# Patient Record
Sex: MALE | Race: WHITE
[De-identification: names, ages, dates, MRNs, and addresses within clinical notes are randomized per-mention and may not be internally consistent; named-entity substitution may affect disease eponyms.]

---

## 2017-06-15 ENCOUNTER — HOSPITAL ENCOUNTER (OUTPATIENT)
Dept: HOSPITAL 45 - C.LAB1850 | Age: 47
Discharge: HOME | End: 2017-06-15
Attending: INTERNAL MEDICINE
Payer: COMMERCIAL

## 2017-06-15 DIAGNOSIS — M10.9: ICD-10-CM

## 2017-06-15 DIAGNOSIS — R80.9: ICD-10-CM

## 2017-06-15 DIAGNOSIS — E10.65: ICD-10-CM

## 2017-06-15 DIAGNOSIS — R68.89: Primary | ICD-10-CM

## 2017-06-15 DIAGNOSIS — I48.0: ICD-10-CM

## 2017-06-15 LAB
ALBUMIN/GLOB SERPL: 1 {RATIO} (ref 0.9–2)
ALP SERPL-CCNC: 105 U/L (ref 45–117)
ALT SERPL-CCNC: 38 U/L (ref 12–78)
ANION GAP SERPL CALC-SCNC: 7 MMOL/L (ref 3–11)
AST SERPL-CCNC: 17 U/L (ref 15–37)
BASOPHILS # BLD: 0.03 K/UL (ref 0–0.2)
BASOPHILS NFR BLD: 1 %
BUN SERPL-MCNC: 17 MG/DL (ref 7–18)
BUN/CREAT SERPL: 11.1 (ref 10–20)
CALCIUM SERPL-MCNC: 9.1 MG/DL (ref 8.5–10.1)
CHLORIDE SERPL-SCNC: 104 MMOL/L (ref 98–107)
CHOLEST/HDLC SERPL: 2.4 {RATIO}
CO2 SERPL-SCNC: 27 MMOL/L (ref 21–32)
COMPLETE: YES
CREAT SERPL-MCNC: 1.5 MG/DL (ref 0.6–1.4)
EOSINOPHIL NFR BLD AUTO: 158 K/UL (ref 130–400)
EST. AVERAGE GLUCOSE BLD GHB EST-MCNC: 192 MG/DL
GLOBULIN SER-MCNC: 4.1 GM/DL (ref 2.5–4)
GLUCOSE SERPL-MCNC: 202 MG/DL (ref 70–99)
GLUCOSE UR QL: 44 MG/DL
HCT VFR BLD CALC: 44.6 % (ref 42–52)
IG%: 0.3 %
IMM GRANULOCYTES NFR BLD AUTO: 29.6 %
KETONES UR QL STRIP: 43 MG/DL
LYME DISEASE AB IGG: (no result)
LYME DISEASE AB IGM: (no result)
LYMPHOCYTES # BLD: 0.92 K/UL (ref 1.2–3.4)
MAGNESIUM SERPL-MCNC: 2.2 MG/DL (ref 1.8–2.4)
MCH RBC QN AUTO: 29 PG (ref 25–34)
MCHC RBC AUTO-ENTMCNC: 33.2 G/DL (ref 32–36)
MCV RBC AUTO: 87.5 FL (ref 80–100)
MONOCYTES NFR BLD: 19.9 %
NEUTROPHILS # BLD AUTO: 2.9 %
NEUTROPHILS NFR BLD AUTO: 46.3 %
NITRITE UR QL STRIP: 102 MG/DL (ref 0–150)
PH UR: 107 MG/DL (ref 0–200)
PMV BLD AUTO: 10.8 FL (ref 7.4–10.4)
POTASSIUM SERPL-SCNC: 4 MMOL/L (ref 3.5–5.1)
RBC # BLD AUTO: 5.1 M/UL (ref 4.7–6.1)
SODIUM SERPL-SCNC: 138 MMOL/L (ref 136–145)
URATE SERPL-MCNC: 5.7 MG/DL (ref 2.6–7.2)
VERY LOW DENSITY LIPOPROT CALC: 20 MG/DL
WBC # BLD AUTO: 3.11 K/UL (ref 4.8–10.8)

## 2017-07-28 ENCOUNTER — HOSPITAL ENCOUNTER (INPATIENT)
Dept: HOSPITAL 45 - C.EDB | Age: 47
LOS: 1 days | Discharge: HOME | DRG: 291 | End: 2017-07-29
Attending: INTERNAL MEDICINE | Admitting: INTERNAL MEDICINE
Payer: COMMERCIAL

## 2017-07-28 VITALS
HEIGHT: 70 IN | HEIGHT: 70 IN | BODY MASS INDEX: 20.55 KG/M2 | WEIGHT: 143.52 LBS | BODY MASS INDEX: 20.55 KG/M2 | WEIGHT: 143.52 LBS

## 2017-07-28 VITALS
OXYGEN SATURATION: 96 % | TEMPERATURE: 98.24 F | HEART RATE: 84 BPM | SYSTOLIC BLOOD PRESSURE: 106 MMHG | DIASTOLIC BLOOD PRESSURE: 73 MMHG

## 2017-07-28 VITALS
TEMPERATURE: 97.34 F | HEART RATE: 87 BPM | OXYGEN SATURATION: 94 % | DIASTOLIC BLOOD PRESSURE: 82 MMHG | SYSTOLIC BLOOD PRESSURE: 120 MMHG

## 2017-07-28 VITALS — OXYGEN SATURATION: 96 %

## 2017-07-28 VITALS — OXYGEN SATURATION: 88 %

## 2017-07-28 DIAGNOSIS — J96.01: ICD-10-CM

## 2017-07-28 DIAGNOSIS — I50.23: Primary | ICD-10-CM

## 2017-07-28 DIAGNOSIS — N18.3: ICD-10-CM

## 2017-07-28 DIAGNOSIS — E78.5: ICD-10-CM

## 2017-07-28 DIAGNOSIS — M10.9: ICD-10-CM

## 2017-07-28 DIAGNOSIS — I42.9: ICD-10-CM

## 2017-07-28 DIAGNOSIS — Z79.899: ICD-10-CM

## 2017-07-28 DIAGNOSIS — I13.0: ICD-10-CM

## 2017-07-28 DIAGNOSIS — Z79.82: ICD-10-CM

## 2017-07-28 DIAGNOSIS — Z79.4: ICD-10-CM

## 2017-07-28 DIAGNOSIS — E10.22: ICD-10-CM

## 2017-07-28 LAB
ANION GAP SERPL CALC-SCNC: 8 MMOL/L (ref 3–11)
BASOPHILS # BLD: 0.01 K/UL (ref 0–0.2)
BASOPHILS NFR BLD: 0.1 %
BUN SERPL-MCNC: 21 MG/DL (ref 7–18)
BUN/CREAT SERPL: 15.1 (ref 10–20)
CALCIUM SERPL-MCNC: 9.5 MG/DL (ref 8.5–10.1)
CHLORIDE SERPL-SCNC: 105 MMOL/L (ref 98–107)
CHOLEST/HDLC SERPL: 2.5 {RATIO}
CKMB/CK RATIO: 0.7 (ref 0–3)
CO2 SERPL-SCNC: 25 MMOL/L (ref 21–32)
COMPLETE: YES
CREAT CL PREDICTED SERPL C-G-VRATE: 87.1 ML/MIN
CREAT SERPL-MCNC: 1.4 MG/DL (ref 0.6–1.4)
EOSINOPHIL NFR BLD AUTO: 171 K/UL (ref 130–400)
GLUCOSE SERPL-MCNC: 170 MG/DL (ref 70–99)
GLUCOSE UR QL: 52 MG/DL
HCT VFR BLD CALC: 44.9 % (ref 42–52)
IG%: 0.1 %
IMM GRANULOCYTES NFR BLD AUTO: 9.7 %
INR PPP: 1 (ref 0.9–1.1)
KETONES UR QL STRIP: 60 MG/DL
LYMPHOCYTES # BLD: 1.1 K/UL (ref 1.2–3.4)
MCH RBC QN AUTO: 30 PG (ref 25–34)
MCHC RBC AUTO-ENTMCNC: 34.5 G/DL (ref 32–36)
MCV RBC AUTO: 87 FL (ref 80–100)
MONOCYTES NFR BLD: 6.4 %
NEUTROPHILS # BLD AUTO: 0.4 %
NEUTROPHILS NFR BLD AUTO: 83.3 %
NITRITE UR QL STRIP: 89 MG/DL (ref 0–150)
PARTIAL THROMBOPLASTIN RATIO: 0.9
PH UR: 130 MG/DL (ref 0–200)
PMV BLD AUTO: 11.3 FL (ref 7.4–10.4)
POTASSIUM SERPL-SCNC: 4 MMOL/L (ref 3.5–5.1)
PROTHROMBIN TIME: 10.7 SECONDS (ref 9–12)
RBC # BLD AUTO: 5.16 M/UL (ref 4.7–6.1)
SODIUM SERPL-SCNC: 138 MMOL/L (ref 136–145)
VERY LOW DENSITY LIPOPROT CALC: 18 MG/DL
WBC # BLD AUTO: 11.29 K/UL (ref 4.8–10.8)

## 2017-07-28 RX ADMIN — INSULIN ASPART SCH UNITS: 100 INJECTION, SOLUTION INTRAVENOUS; SUBCUTANEOUS at 21:19

## 2017-07-28 RX ADMIN — ACETYLCYSTEINE SCH MG: 200 SOLUTION ORAL; RESPIRATORY (INHALATION) at 20:13

## 2017-07-28 RX ADMIN — HEPARIN SODIUM SCH UNIT: 10000 INJECTION, SOLUTION INTRAVENOUS; SUBCUTANEOUS at 21:18

## 2017-07-28 RX ADMIN — ALLOPURINOL SCH MG: 100 TABLET ORAL at 20:13

## 2017-07-28 RX ADMIN — CARVEDILOL SCH MG: 25 TABLET, FILM COATED ORAL at 20:13

## 2017-07-28 RX ADMIN — ACETYLCYSTEINE SCH MG: 200 SOLUTION ORAL; RESPIRATORY (INHALATION) at 15:43

## 2017-07-28 RX ADMIN — FUROSEMIDE SCH MLS/MIN: 10 INJECTION, SOLUTION INTRAMUSCULAR; INTRAVENOUS at 15:42

## 2017-07-28 RX ADMIN — INSULIN ASPART SCH UNITS: 100 INJECTION, SOLUTION INTRAVENOUS; SUBCUTANEOUS at 17:50

## 2017-07-28 NOTE — CARDIOLOGY CONSULTATION
Cardiology Consultation


Date of Consultation:


Jul 28, 2017.


Requesting Physician:


Mynor


Reason for Consultation:


CHF


Pt evaluation today including:  conversation w/ patient, physical exam, chart 

review, lab review, review of studies


History of Present Illness


The patient is a 47-year-old gentleman with a history of a nonischemic 

cardiomyopathy.  Patient states that he would his usual state of health until 

approximately 3 days ago when he began to experience progressive dyspnea.  This 

was initially with exertion but progressed to the point where he had to sleep 

upright due to significant breathing difficulty and coughing.  Over this period 

of time he also had some generalized fatigue and weakness.  He had an element 

of chest discomfort that was positional in nature and likely pleuritic.  He did 

not report symptoms of dizziness or lightheadedness.  He did have a rare 

sensation of a fluttering in his chest which was fleeting in nature.  He did 

not report any notable edema.  He does not measure his weight her blood 

pressure routinely.  He denied any dietary noncompliance.  He has not had a 

marked change in his diet.  Patient would evaluation at [facility] room was discovered to have an element of pulmonary vascular 

congestion.  He underwent an element of diuresis over the course of today is 

currently feeling much better.  He states that his breathing is significantly 

improved.  He was lying flat in bed when I started the interview today.





Past Medical/Surgical History


Nonischemic cardiomyopathy


Ventricular tachycardia sustained


Diabetes mellitus


Renal insufficiency


Hyperlipidemia


Gout


History of atrial fibrillation





Surgical history





Implantation of biventricular ICD.  [employer]





Family History





Diabetes mellitus


FH: cancer


FH: heart disease


Hypertension


Kidney disease


Kidney stones





Social History


Smoking Status:  Never Smoker


History of Alcohol Use:  No


Currently works in sales.





Review of Systems


Constitutional:  + see HPI


Respiratory:  + see HPI


Cardiac:  + see HPI


Abdomen:  + see HPI


Male :  + see HPI


Neurologic:  + see HPI


Heme:  + see HPI


Endo:  + see HPI


Skin:  + see HPI


All Other Systems:  Reviewed and Negative





Allergies


Coded Allergies:  


     Morphine (Verified  Adverse Reaction, Unknown, SHORTNESS OF BREATH IF 

DELIVERED FAST, 7/28/17)


 STATED "IF IT'S DELIVERED FAST IT TAKES MY BREATH AWAY"





Medications





Current Inpatient Medications








 Medications


  (Trade)  Dose


 Ordered  Sig/Zamzam


 Route  Start Time


 Stop Time Status Last Admin


Dose Admin


 


 Ioversol


  (Optiray 320)  111 ml  UD  PRN


 IV  7/28/17 08:45


 8/1/17 08:44   


 


 


 Allopurinol


  (Zyloprim Tab)  50 mg  BID


 PO  7/28/17 21:00


 8/27/17 20:59   


 


 


 Aspirin


  (Ecotrin Tab)  81 mg  DAILY


 PO  7/29/17 09:00


 8/28/17 08:59   


 


 


 Atorvastatin


 Calcium


  (Lipitor Tab)  20 mg  DAILY


 PO  7/29/17 09:00


 8/28/17 08:59   


 


 


 Carvedilol


  (Coreg Tab)  25 mg  BID


 PO  7/28/17 21:00


 8/27/17 20:59   


 


 


 Insulin Glargine


  (Lantus Solostar


 Pen)  38 units  HS


 SC  7/28/17 21:00


 8/27/17 20:59   


 


 


 Pantoprazole


 Sodium


  (Protonix Tab)  40 mg  QAM


 PO  7/29/17 09:00


 8/28/17 08:59   


 


 


 Furosemide 20 mg/


 Syringe  2 ml @ 4


 mls/min  BID17


 IV  7/28/17 17:00


 8/27/17 16:59  7/28/17 15:42


4 MLS/MIN


 


 Heparin Sodium


  (Porcine)


  (Heparin Sq 5000


 Unit/0.5ml)  5,000 unit  Q8


 SQ  7/28/17 22:00


 8/27/17 21:59   


 


 


 Acetaminophen


  (Tylenol Tab)  650 mg  Q4H  PRN


 PO  7/28/17 10:15


 8/27/17 10:14   


 


 


 Al Hydrox/Mg


 Hydrox/Simethicone


  (Maalox Max Susp)  15 ml  Q4H  PRN


 PO  7/28/17 10:15


 8/27/17 10:14   


 


 


 Magnesium


 Hydroxide


  (Milk Of


 Magnesia Susp)  30 ml  Q12H  PRN


 PO  7/28/17 10:15


 8/27/17 10:14   


 


 


 Zolpidem Tartrate


  (Ambien Tab)  5 mg  HSZ  PRN


 PO  7/28/17 10:15


 8/27/17 10:14   


 


 


 Nitroglycerin


  (Nitrostat Tab)  0.4 mg  UD  PRN


 SL  7/28/17 10:15


 8/27/17 10:14   


 


 


 Insulin Aspart


  (novoLOG ASPART)  **SLIDING


 SCALE**


 **If C...  ACHS


 SC  7/28/17 16:30


 8/27/17 16:29  7/28/17 17:50


9 UNITS


 


 Glucose


  (Glucose 40% Gel)  15-30


 GRAMS 15


 GRAMS...  UD  PRN


 PO  7/28/17 10:15


 8/27/17 10:14   


 


 


 Glucose


  (Glucose Chew


 Tab)  4-8


 Tablets 4


 Tabl...  UD  PRN


 PO  7/28/17 10:15


 8/27/17 10:14   


 


 


 Dextrose


  (Dextrose 50%


 50ML Syringe)  25-50ML OF


 50% DW IV


 FOR...  UD  PRN


 IV  7/28/17 10:15


 8/27/17 10:14   


 


 


 Glucagon


  (Glucagon Inj)  1 mg  UD  PRN


 SQ  7/28/17 10:15


 8/27/17 10:14   


 


 


 Acetylcysteine


  (Mucomyst 200mg/


 ml Soln)  1,200 mg  Q12


 PO  7/28/17 14:31


 7/30/17 21:01  7/28/17 15:43


1,200 MG


 


 Magnesium Oxide


  (Mag-Ox Tab)  400 mg  DAILY


 PO  7/29/17 09:00


 8/28/17 08:59   


 











Physical Exam





Vital Signs Past 12 Hours








  Date Time  Temp Pulse Resp B/P (MAP) Pulse Ox O2 Delivery O2 Flow Rate FiO2


 


7/28/17 16:09     96 Room Air  


 


7/28/17 13:40 36.8 84 18 106/73 (84) 96 Room Air  


 


7/28/17 12:30  88 18 109/73 97 Nasal Cannula 2.0 


 


7/28/17 11:17  81 16 111/63 98 Nasal Cannula 2.0 


 


7/28/17 10:24  104      


 


7/28/17 09:11  100 18 127/79 95 Nasal Cannula 2.0 


 


7/28/17 08:35     88 Room Air  


 


7/28/17 07:41  111      


 


7/28/17 07:31     88 Room Air  


 


7/28/17 07:30     95 Nasal Cannula 2.0 


 


7/28/17 07:29 37.1 117 20 145/83 88 Room Air  








The patient is alert and oriented. Mood and affect appeared normal. He answered 

all questions appropriately.


HEENT:  Pupils are equal and reactive to light and accommodation.  Extraocular 

movements are intact. The sclerae are anicteric.


Neuro:  Cranial nerves intact


Neck:  Patient's neck is supple.  He has palpable carotid pulses bilaterally 

without bruits on auscultation.  There is no evidence of jugular venous 

distention. The thyroid is not enlarged. 


Lungs:  Clear to auscultation bilaterally.  He has good air movement without 

use of accessory muscles. No rales wheezes or rhonchi.


Chest:  Well-healed ICD implant site in the left upper pectoral area


Cardiac:  Heart demonstrates a regular rate and rhythm.  Normal S1 and S2. No 

murmurs on examination.


Pulses:  The patient has palpable radial pulses bilaterally that are equal in 

intensity


Extremities:  There was no evidence of hypoperfusion.  There is no cyanosis or 

clubbing.  There is no edema.


Skin:  I did not appreciate any rashes on examination today.





Data


Laboratory Results:





Last 24 Hours








Test


  7/28/17


07:52 7/28/17


14:41 7/28/17


16:04 7/28/17


16:17


 


White Blood Count 11.29 K/uL    


 


Red Blood Count 5.16 M/uL    


 


Hemoglobin 15.5 g/dL    


 


Hematocrit 44.9 %    


 


Mean Corpuscular Volume 87.0 fL    


 


Mean Corpuscular Hemoglobin 30.0 pg    


 


Mean Corpuscular Hemoglobin


Concent 34.5 g/dl 


  


  


  


 


 


Platelet Count 171 K/uL    


 


Mean Platelet Volume 11.3 fL    


 


Neutrophils (%) (Auto) 83.3 %    


 


Lymphocytes (%) (Auto) 9.7 %    


 


Monocytes (%) (Auto) 6.4 %    


 


Eosinophils (%) (Auto) 0.4 %    


 


Basophils (%) (Auto) 0.1 %    


 


Neutrophils # (Auto) 9.41 K/uL    


 


Lymphocytes # (Auto) 1.10 K/uL    


 


Monocytes # (Auto) 0.72 K/uL    


 


Eosinophils # (Auto) 0.04 K/uL    


 


Basophils # (Auto) 0.01 K/uL    


 


RDW Standard Deviation 41.1 fL    


 


RDW Coefficient of Variation 12.9 %    


 


Immature Granulocyte % (Auto) 0.1 %    


 


Immature Granulocyte # (Auto) 0.01 K/uL    


 


D-Dimer 710 ug/L FEU    


 


Sodium Level 138 mmol/L    


 


Potassium Level 4.0 mmol/L    


 


Chloride Level 105 mmol/L    


 


Carbon Dioxide Level 25 mmol/L    


 


Anion Gap 8.0 mmol/L    


 


Blood Urea Nitrogen 21 mg/dl    


 


Creatinine 1.40 mg/dl    


 


Est Creatinine Clear Calc


Drug Dose 87.1 ml/min 


  


  


  


 


 


Estimated GFR (


American) 69.8 


  


  


  


 


 


Estimated GFR (Non-


American 60.3 


  


  


  


 


 


BUN/Creatinine Ratio 15.1    


 


Random Glucose 170 mg/dl    


 


Calcium Level 9.5 mg/dl    


 


Total Creatine Kinase 124 U/L   94 U/L  


 


Creatine Kinase MB 0.9 ng/ml    


 


Creatine Kinase MB Ratio 0.7    


 


Troponin I 0.024 ng/ml   0.024 ng/ml  


 


Pro-B-Type Natriuretic Peptide 2176 pg/ml    


 


Triglycerides Level 89 mg/dl    


 


Cholesterol Level 130 mg/dl    


 


HDL Cholesterol 52 mg/dl    


 


LDL Cholesterol, Calculated 60 mg/dl    


 


VLDL Cholesterol, Calculated 18 mg/dl    


 


Cholesterol/HDL Ratio 2.5    


 


Prothrombin Time  10.7 SECONDS   


 


Prothromb Time International


Ratio 


  1.0 


  


  


 


 


Activated Partial


Thromboplast Time 


  23.1 SECONDS 


  


  


 


 


Partial Thromboplastin Ratio  0.9   


 


Bedside Glucose    266 mg/dl 








Imaging:  Chest x-ray demonstrated cardiomegaly an element of pulmonary 

vascular congestion





EKG:  A sensed V paced.





I performed a device interrogation which revealed good pacing percentages.  

There is good sensing on the atrial and ventricular leads.  There were no 

arrhythmias recorded.





Assessment & Plan


1. Acute decompensated systolic heart failure:  Patient had elevated BNP, 

symptoms and objective findings consistent with pulmonary vascular congestion.  

This appears to have evolved over several days.  The exact etiology of his 

decompensation is not clear.  He did not have any arrhythmia such as atrial 

fibrillation.  He did not report any dietary noncompliance.  He appears to be 

compliant with medical therapy.  He may have had significantly elevated blood 

pressure, but this was not measured.  I doubt that he has had a significant 

reduction in his overall LV systolic function as it has not been known to be 

quite poor in any event.  His examination does not support the development of 

any acute valvular lesions. He has responded nicely to diuresis.  The exact 

amount of diuresis is not available as no BERINCE does have been obtained.  However

, his exam is currently normal and he has clinically improved.  This point 

would seem reasonable continue him on low-dose diuretic and his outpatient 

medical regimen.  I would occur gym to monitor his weights daily and use 

diuretics as needed for significant weight gain.


2. Nonischemic cardiomyopathy:  On Ace inhibitor and beta-blocker.  Also with 

resynchronization therapy.


3. Reported history of atrial fibrillation:  No recorded events recently.

## 2017-07-28 NOTE — HISTORY & PHYSICAL EXAMINATION
DATE OF ADMISSION:  07/28/2017

 

CHIEF COMPLAINT:  Shortness of breath.

 

HISTORY OF PRESENT ILLNESS:  The patient is a 47-year-old man with past

medical history of diabetes mellitus, insulin requiring.  The patient also

has history of nonischemic cardiomyopathy started in 1998.  He was at that

time on Lasix, but he quickly got off Lasix.  In 2003, the patient had a

ventricular arrhythmia and was sent to [place] and had an AICD device placed.

 The patient was not on Lasix at that time and was doing okay on lisinopril

and Coreg.  Two days ago, patient started having shortness of breath.  Worse

when he lays flat with chest tightness.  Also having some dry cough.  The

patient had to use more than 3 pillows to be able to sleep.  The patient also

was feeling hot and sweaty.  His cough was mainly nonproductive and mainly

when he lays flat.  The patient presented today to the ED and was found to

have an oxygen saturation of 88%.  ER physician did a D-dimer that was mildly

elevated.  CT angiogram was done and revealed no pulmonary embolism, but

there was marked cardiomegaly, left ventricular dilation and suggestive of

mild pulmonary edema.  The patient will be admitted for evaluation and

treatment.

 

PAST MEDICAL HISTORY:  As mentioned in HPI, hypertension, diabetes,

nonischemic cardiomyopathy and obesity.

 

SOCIAL HISTORY:  Does not smoke or drink.  Lives by himself.

 

FAMILY HISTORY:  Father had multiple heart attacks, also has family history

of diabetes.

 

REVIEW OF SYSTEMS:  Denies any headache, double vision, blurry vision. 

Denies any chest pain but admits to shortness of breath and tightness when he

lays flat.  Denies any focal weakness, tingling, numbness.  Denies any runny

nose.  Denies any diarrhea, blood in the stool.  Denies any burning sensation

in the urine or blood.  Denies any excessive sputum production with his

cough.  Admits to some joint pain in his ankles and knees.  Rest of the

review of systems is negative.

 

CURRENT MEDICATIONS:

1.  Allopurinol.

2.  Aspirin.

3.  Atorvastatin.

4.  Carvedilol 25 mg p.o. b.i.d.

5.  Insulin glargine-[id number]/33 daily.

6.  Insulin lispro sliding scale.

7.  Lisinopril 20 mg in a.m. and 10 mg in p.m.

8.  Magnesium 100 mg daily.

9.  Omeprazole.

 

ALLERGIES:  MORPHINE.

 

PHYSICAL EXAMINATION:

VITAL SIGNS:  Temperature 37.1, heart rate is 100, respirations 18, blood

pressure 127/79, pulse ox is 95% on nasal cannula  and 88% on room air.

HEENT:  No jaundice, no pallor, wet mucous membranes.

NECK:  Supple.

HEART:  S1, S2, slightly distant.  No gallop, rub or murmur.

LUNGS:  Decreased air entry bilaterally.  Bilateral basilar rales.

ABDOMEN:  Soft, nontender, nondistended.

NEUROLOGIC:  Awake, alert, oriented to time, place, and person.  Moves all

extremities.  Sensation intact.  Cranial nerves II-XII appear to be intact.

SKIN:  No rash or erythema in exposed skin area.

PSYCHIATRIC:  Appropriate affect and process of thinking.

 

LABORATORY DATA:  White blood cell count 11.2, hemoglobin 15.5 and platelets

171.  BUN is 21, creatinine 1.4.  EKG showed paced rhythm at 108 beats per

minute.

 

ASSESSMENT:

1.  Acute hypoxic respiratory failure secondary to below.

2.  Acute systolic congestive heart failure, acute on chronic.

3.  Diabetes mellitus, insulin requiring.

4.  Dyslipidemia.

5.  Morbid obesity.

6.  Hypertension.

7.  Clinically suspected obstructive sleep apnea.

8.  Chronic kidney disease stage III.

 

PLAN:

1.  Admit to telemetry.

2.  The patient was exposed to contrast.  His creatinine is 1.4, which

appears to be his baseline.  We will start him on Mucomyst 1200 p.o. b.i.d.

for 3 days and will hold his lisinopril for now to give room for Lasix and to

prevent further kidney compromise.

3.  Lasix 20 mg IV b.i.d. with calculation of I&Os, patient was not on any

Lasix.  I expect him to be sensitive to Lasix but if he is not showing

adequate response, then Lasix can be increased accordingly after rechecking

renal function again tomorrow.

4.  Continue Coreg same dose.

5.  Obtain 2D echo.

6.  Cardiology consult.

7.  Insulin sliding scale and continue his regular dose of Lantus at home.

8.  Obtain lipid panel and hemoglobin A1c to stratify his risk factors.

9.  Heparin for DVT prophylaxis.

10.  The patient was consulted about obstructive sleep apnea and was directed

to have a sleep study as an outpatient, he acknowledged that he understands.

 

 

 

ROMA

## 2017-07-28 NOTE — DIAGNOSTIC IMAGING REPORT
CT ANGIOGRAPHY OF THE CHEST, PULMONARY EMBOLUS PROTOCOL



CLINICAL HISTORY: Hypoxia. Elevated d-dimer.    



COMPARISON STUDY:  Chest radiograph September 25, 2011 and September 20, 2017

per 



TECHNIQUE: Following IV administration of 92 mL of Optiray-320, helical axial

images of the chest were obtained utilizing the pulmonary embolus protocol. 

Maximal intensity projections and sagittal and coronal reformats were viewed on

an independent 3D workstation.  IV contrast was administered without

complication.  A dose lowering technique was utilized adhering to the principles

of ALARA.





CT DOSE: 526.44 mGycm



FINDINGS:  No pulmonary emboli are identified although the segmental and

subsegmental pulmonary arteries are suboptimally assessed due to respiratory

motion and quantum mottle artifact. A left subclavian biventricular pacer is in

place. There is moderate to marked cardiomegaly with pronounced left ventricular

dilatation with there is moderate left atrial enlargement. There is no

pericardial effusion. No enlarged axillary, mediastinal or hilar lymph nodes are

present. Caliber of the thoracic aorta is normal. There are small bilateral

pleural effusions. No pneumothorax is present. No consolidation is identified to

suggest pneumonia. There are mild groundglass opacities within the lower lobes

with suspected interlobular septal thickening. Bony thorax is unremarkable.

There is probable fatty infiltration of the liver.



IMPRESSION:  



1. No pulmonary emboli identified. Segmental and subsegmental pulmonary arteries

suboptimally assessed on this exam.



2. Moderate to marked cardiomegaly with marked left ventricular dilatation.

Moderate left atrial dilatation. No pericardial effusion.



3. Small bilateral pleural effusions.



4. Findings suggestive of mild pulmonary edema. No consolidation to suggest

pneumonia.











Electronically signed by:  Thee Daniels M.D.

7/28/2017 9:17 AM



Dictated Date/Time:  7/28/2017 9:08 AM

## 2017-07-28 NOTE — DIAGNOSTIC IMAGING REPORT
SINGLE VIEW CHEST



CLINICAL HISTORY:  Atypical chest pain.



FINDINGS: An AP, portable, upright chest radiograph is compared to study dated

9/25/2011. The examination is degraded by portable technique and patient

rotation.  A 3-lead cardiac AICD is unchanged in position and partially obscures

the left upper chest. The heart is enlarged. The pulmonary vasculature is

noncongested. The lungs and pleural spaces are clear. No pneumothorax is seen.

The bony thorax is grossly intact.



IMPRESSION:



1. Cardiomegaly and AICD. There is no radiographic evidence of congestive

failure.



2. No airspace consolidation or pleural effusion is seen.







Electronically signed by:  Ronal Sarmiento M.D.

7/28/2017 8:01 AM



Dictated Date/Time:  7/28/2017 7:59 AM

## 2017-07-28 NOTE — EMERGENCY ROOM VISIT NOTE
History


Report prepared by Amaury:  Josefina Schwarz


Under the Supervision of:  Dr. Gopal Archer D.O.


First contact with patient:  07:31


Chief Complaint:  RESPIRATORY PROBLEMS


Stated Complaint:  HAVING TROUBLE BREATHING-CAN'T LIE DOWN


Nursing Triage Summary:  


Pt reports sob and weakness since Wed evening. Cough when lying down. 

Midsternal 


cp into throat last night when lying down. Sweats. 





Hx of cardiomyopathy.





History of Present Illness


The patient is a 47 year old male who presents to the Emergency Room with 

complaints of severe and worsening shortness of breath starting 2 days ago. He 

has worsening difficulty breathing with lying down and exertion. He does not 

wear oxygen at home. While lying down last night, the patient had some mid-

chest pain. He did not have any chest pain this morning. He denies any 

pleuritic chest pain, nausea, vomiting, diarrhea, lower extremity swelling, or 

any other complaints. He is unsure about any recent weight gain. He did not 

have any recent surgeries, or travels. The patient has a history of 

cardiomyopathy, diabetes, and hypertension. He had v-fib syncope in 2011. He 

has a pacemaker in place. He does not have a history of blood clots, or lung 

disease. He is not on any blood thinners. He denies any history of cigarette 

smoking.





   Source of History:  patient


   Onset:  2 days ago


   Position:  other (global)


   Symptom Intensity:  severe


   Quality:  other (shortness of breath)


   Timing:  worsening


   Modifying Factors (Worsening):  exertion, other (lying down)


   Associated Symptoms:  No nausea, No vomiting, No diarrhea





Review of Systems


See HPI for pertinent positives & negatives. A total of 10 systems reviewed and 

were otherwise negative.





Past Medical & Surgical


Medical Problems:


(1) Cardiomyopathy


(2) Diabetes mellitus type 1


(3) Hypertension


(4) Hypomagnesemia


(5) Ventricular fibrillation








Family History





Diabetes mellitus


FH: cancer


FH: heart disease


Hypertension


Kidney disease


Kidney stones





Social History


Smoking Status:  Never Smoker


Marital Status:  single


Occupation Status:  employed





Current/Historical Medications


Scheduled


Allopurinol (Zyloprim), 0.5 TAB PO BID


Aspirin (Aspirin Ec), 81 MG PO DAILY


Atorvastatin (Lipitor), 20 MG PO DAILY


Carvedilol (Coreg), 25 MG PO BID


Insulin Glargine-Lixisenatide (Soliqua 100/33 100-33 Unt-Mcg/ml), 38 UNITS SC HS


Insulin Lispro (Human) (Humalog), SC WM


Lisinopril (Zestril), 20 MG PO QAM


Lisinopril (Zestril), 10 MG PO QPM


Magnesium (Magnesium), 100 MG PO QAM





Miscellaneous Medications


Omeprazole Magnesium (Prilosec Otc), 20 MG PO





Allergies


Coded Allergies:  


     Morphine (Verified  Adverse Reaction, Unknown, SHORTNESS OF BREATH IF 

DELIVERED FAST, 7/28/17)


 STATED "IF IT'S DELIVERED FAST IT TAKES MY BREATH AWAY"





Physical Exam


Vital Signs











  Date Time  Temp Pulse Resp B/P (MAP) Pulse Ox O2 Delivery O2 Flow Rate FiO2


 


7/28/17 12:30  88 18 109/73 97 Nasal Cannula 2.0 


 


7/28/17 11:17  81 16 111/63 98 Nasal Cannula 2.0 


 


7/28/17 10:24  104      


 


7/28/17 09:11  100 18 127/79 95 Nasal Cannula 2.0 


 


7/28/17 08:35     88 Room Air  


 


7/28/17 07:41  111      


 


7/28/17 07:31     88 Room Air  


 


7/28/17 07:30     95 Nasal Cannula 2.0 


 


7/28/17 07:29 37.1 117 20 145/83 88 Room Air  











Physical Exam


GENERAL: Sitting up in bed, diaphoretic, ill-appearing. Nasal canula in place. 


EYE EXAM: normal conjunctiva


OROPHARYNX: no exudate, no erythema, lips, buccal mucosa, and tongue normal and 

mucous membranes are moist


NECK: supple, no nuchal rigidity, no adenopathy, non-tender. No JVD. 


LUNGS: Faint rhonchi bilaterally. Normal chest wall mechanics


HEART: no murmurs, S1 normal and S2 normal 


ABDOMEN: abdomen soft, non-tender, normo-active bowel sounds, no masses, no 

rebound or guarding. 


BACK: Back is symmetrical on inspection and there is no deformity, no midline 

tenderness, no CVA tenderness. 


SKIN: no rashes and no bruising 


UPPER EXTREMITIES: upper extremities are grossly normal. 


LOWER EXTREMITIES: No pitting edema. Calves are equal bilaterally. 


NEURO EXAM: Normal sensorium, cranial nerves II-XII grossly intact, normal 

speech,  no gross weakness of arms, no gross weakness of legs.





Medical Decision & Procedures


ER Provider


Diagnostic Interpretation:


X-ray and CTA results as stated below per my review and the radiologist's 

interpretation: 





SINGLE VIEW CHEST





CLINICAL HISTORY:  Atypical chest pain.





FINDINGS: An AP, portable, upright chest radiograph is compared to study dated


9/25/2011. The examination is degraded by portable technique and patient


rotation.  A 3-lead cardiac AICD is unchanged in position and partially obscures


the left upper chest. The heart is enlarged. The pulmonary vasculature is


noncongested. The lungs and pleural spaces are clear. No pneumothorax is seen.


The bony thorax is grossly intact.





IMPRESSION:





1. Cardiomegaly and AICD. There is no radiographic evidence of congestive


failure.





2. No airspace consolidation or pleural effusion is seen.











Electronically signed by:  Ronal Sarmiento M.D.


7/28/2017 8:01 AM





Dictated Date/Time:  7/28/2017 7:59 AM








CT ANGIOGRAPHY OF THE CHEST, PULMONARY EMBOLUS PROTOCOL





CLINICAL HISTORY: Hypoxia. Elevated d-dimer.    





COMPARISON STUDY:  Chest radiograph September 25, 2011 and September 20, 2017


per 





TECHNIQUE: Following IV administration of 92 mL of Optiray-320, helical axial


images of the chest were obtained utilizing the pulmonary embolus protocol. 


Maximal intensity projections and sagittal and coronal reformats were viewed on


an independent 3D workstation.  IV contrast was administered without


complication.  A dose lowering technique was utilized adhering to the principles


of ALARA.








CT DOSE: 526.44 mGycm





FINDINGS:  No pulmonary emboli are identified although the segmental and


subsegmental pulmonary arteries are suboptimally assessed due to respiratory


motion and quantum mottle artifact. A left subclavian biventricular pacer is in


place. There is moderate to marked cardiomegaly with pronounced left ventricular


dilatation with there is moderate left atrial enlargement. There is no


pericardial effusion. No enlarged axillary, mediastinal or hilar lymph nodes are


present. Caliber of the thoracic aorta is normal. There are small bilateral


pleural effusions. No pneumothorax is present. No consolidation is identified to


suggest pneumonia. There are mild groundglass opacities within the lower lobes


with suspected interlobular septal thickening. Bony thorax is unremarkable.


There is probable fatty infiltration of the liver.





IMPRESSION:  





1. No pulmonary emboli identified. Segmental and subsegmental pulmonary arteries


suboptimally assessed on this exam.





2. Moderate to marked cardiomegaly with marked left ventricular dilatation.


Moderate left atrial dilatation. No pericardial effusion.





3. Small bilateral pleural effusions.





4. Findings suggestive of mild pulmonary edema. No consolidation to suggest


pneumonia.

















Electronically signed by:  Thee Daniels M.D.


7/28/2017 9:17 AM





Dictated Date/Time:  7/28/2017 9:08 AM





Laboratory Results


7/28/17 07:52








Red Blood Count 5.16, Mean Corpuscular Volume 87.0, Mean Corpuscular Hemoglobin 

30.0, Mean Corpuscular Hemoglobin Concent 34.5, Mean Platelet Volume 11.3, 

Neutrophils (%) (Auto) 83.3, Lymphocytes (%) (Auto) 9.7, Monocytes (%) (Auto) 

6.4, Eosinophils (%) (Auto) 0.4, Basophils (%) (Auto) 0.1, Neutrophils # (Auto) 

9.41, Lymphocytes # (Auto) 1.10, Monocytes # (Auto) 0.72, Eosinophils # (Auto) 

0.04, Basophils # (Auto) 0.01





7/28/17 07:52

















Test


  7/28/17


07:52


 


White Blood Count


  11.29 K/uL


(4.8-10.8)


 


Red Blood Count


  5.16 M/uL


(4.7-6.1)


 


Hemoglobin


  15.5 g/dL


(14.0-18.0)


 


Hematocrit 44.9 % (42-52) 


 


Mean Corpuscular Volume


  87.0 fL


()


 


Mean Corpuscular Hemoglobin


  30.0 pg


(25-34)


 


Mean Corpuscular Hemoglobin


Concent 34.5 g/dl


(32-36)


 


Platelet Count


  171 K/uL


(130-400)


 


Mean Platelet Volume


  11.3 fL


(7.4-10.4)


 


Neutrophils (%) (Auto) 83.3 % 


 


Lymphocytes (%) (Auto) 9.7 % 


 


Monocytes (%) (Auto) 6.4 % 


 


Eosinophils (%) (Auto) 0.4 % 


 


Basophils (%) (Auto) 0.1 % 


 


Neutrophils # (Auto)


  9.41 K/uL


(1.4-6.5)


 


Lymphocytes # (Auto)


  1.10 K/uL


(1.2-3.4)


 


Monocytes # (Auto)


  0.72 K/uL


(0.11-0.59)


 


Eosinophils # (Auto)


  0.04 K/uL


(0-0.5)


 


Basophils # (Auto)


  0.01 K/uL


(0-0.2)


 


RDW Standard Deviation


  41.1 fL


(36.4-46.3)


 


RDW Coefficient of Variation


  12.9 %


(11.5-14.5)


 


Immature Granulocyte % (Auto) 0.1 % 


 


Immature Granulocyte # (Auto)


  0.01 K/uL


(0.00-0.02)


 


D-Dimer


  710 ug/L FEU


(0-500)


 


Anion Gap


  8.0 mmol/L


(3-11)


 


Est Creatinine Clear Calc


Drug Dose 87.1 ml/min 


 


 


Estimated GFR (


American) 69.8 


 


 


Estimated GFR (Non-


American 60.3 


 


 


BUN/Creatinine Ratio 15.1 (10-20) 


 


Calcium Level


  9.5 mg/dl


(8.5-10.1)


 


Total Creatine Kinase


  124 U/L


()


 


Creatine Kinase MB


  0.9 ng/ml


(0.5-3.6)


 


Creatine Kinase MB Ratio 0.7 (0-3.0) 


 


Troponin I


  0.024 ng/ml


(0-0.045)


 


Pro-B-Type Natriuretic Peptide


  2176 pg/ml


(0-450)


 


Triglycerides Level


  89 mg/dl


(0-150)


 


Cholesterol Level


  130 mg/dl


(0-200)


 


HDL Cholesterol 52 mg/dl 


 


LDL Cholesterol, Calculated 60 mg/dl 


 


VLDL Cholesterol, Calculated 18 mg/dl 


 


Cholesterol/HDL Ratio 2.5 














Laboratory results per my review.





Medications Administered











 Medications


  (Trade)  Dose


 Ordered  Sig/Zamzam


 Route  Start Time


 Stop Time Status Last Admin


Dose Admin


 


 Aspirin


  (Aspirin Chew)  324 mg  NOW  STAT


 PO  7/28/17 07:50


 7/28/17 07:51 DC 7/28/17 07:55


324 MG


 


 Carvedilol


  (Coreg Tab)  25 mg  NOW  ONCE


 PO  7/28/17 09:00


 7/28/17 09:01 DC 7/28/17 09:11


25 MG











ECG


Indication:  SOB/dyspnea


Rate (beats per minute):  108


Rhythm:  other (Atrial sensed ventricularly paced rhythm)


Findings:  LBBB, other (Right axis deviation)


Comparison ECG Date:  June 27, 2013


Change:  no significant change





ED Course


ED COURSE: 


Vital signs were reviewed and showed hypertensive, tachycardic and hypoxic. 


The patients medical record was reviewed


The above diagnostic studies were performed and reviewed.


ED treatments and interventions as stated above. 





0731: The patient was evaluated in room A03. A complete history and physical 

examination was performed.





0750: Aspirin 324 mg PO





0900: Carvedilol 25 mg PO





2126: I discussed the patient's case with Dr. Diana Lowe, with [facility]. Upon reevaluation, the patient is resting comfortably.I 

discussed my findings with the patient and he understands and agrees with the 

treatment plan.   


Based on the patients age, coexisting illnesses, exam and lab findings the 

decision to treat as an inpatient was made.


The patient remained stable while under my care.


The patient will be evaluated for further management.





Medical Decision


Differential diagnoses includes but is not limited to pneumonia, bronchitis, 

COPD/Asthma exacerbation, pneumothorax, pulmonary embolism, congestive heart 

failure, acute coronary syndrome





Patient is a 47-year-old male with a past medical history of nonischemic 

cardiomyopathy with an AICD secondary to V. fib in 2011 that presents the ER 

for exertional shortness of breath associated with shortness of breath when 

lying down.  On exam no overt signs of CHF with exception of mild rhonchi 

bilaterally.  No JVD or pitting edema.  Troponin is detectable but not 

positive.  BNP slightly elevated.  D-dimer was elevated.  CT PE was performed 

and shows mild CHF.  Patient is given home dose of calcium channel blocker.  

Updated at bedside.  Patient was admitted to internal medicine with CHF and 

hypoxia with pulse ox on room air of 88%.  Pacer interrogation was unremarkable.





Medication Reconcilliation


Current Medication List:  was personally reviewed by me





Blood Pressure Screening


Patient's blood pressure:  Elevated blood pressure


Blood pressure disposition:  Elevated BP felt to be situational





Consults


Time Called:  2120


Consulting Physician:  Dr. Diana Lowe, with [facility]


Returned Call:  2126


I discussed the patient's case with Dr. Diana Lowe, with [facility].





Impression





 Primary Impression:  


 CHF (congestive heart failure)


 Additional Impressions:  


 Hypoxia


 Cardiomyopathy





Scribe Attestation


The scribe's documentation has been prepared under my direction and personally 

reviewed by me in its entirety. I confirm that the note above accurately 

reflects all work, treatment, procedures, and medical decision making performed 

by me.





Departure Information


Dispostion


Being Evaluated By Hospitalist





Referrals


ANDREA Coleman MD (PCP)





Patient Instructions


My [facility]





Problem Qualifiers








 Primary Impression:  


 CHF (congestive heart failure)


 Congestive heart failure type:  unspecified congestive heart failure type  

Congestive heart failure chronicity:  unspecified congestive heart failure 

chronicity  Qualified Codes:  I50.9 - Heart failure, unspecified


 Additional Impressions:  


 Cardiomyopathy


 Cardiomyopathy type:  unspecified  Qualified Codes:  I42.9 - Cardiomyopathy, 

unspecified

## 2017-07-29 VITALS
DIASTOLIC BLOOD PRESSURE: 63 MMHG | TEMPERATURE: 97.7 F | HEART RATE: 71 BPM | SYSTOLIC BLOOD PRESSURE: 96 MMHG | OXYGEN SATURATION: 97 %

## 2017-07-29 VITALS
DIASTOLIC BLOOD PRESSURE: 62 MMHG | TEMPERATURE: 97.34 F | SYSTOLIC BLOOD PRESSURE: 103 MMHG | OXYGEN SATURATION: 94 % | HEART RATE: 86 BPM

## 2017-07-29 VITALS
SYSTOLIC BLOOD PRESSURE: 106 MMHG | DIASTOLIC BLOOD PRESSURE: 72 MMHG | HEART RATE: 86 BPM | OXYGEN SATURATION: 95 % | TEMPERATURE: 97.34 F

## 2017-07-29 VITALS
DIASTOLIC BLOOD PRESSURE: 73 MMHG | OXYGEN SATURATION: 95 % | SYSTOLIC BLOOD PRESSURE: 117 MMHG | TEMPERATURE: 97.88 F | HEART RATE: 83 BPM

## 2017-07-29 VITALS
HEART RATE: 86 BPM | OXYGEN SATURATION: 95 % | TEMPERATURE: 97.34 F | SYSTOLIC BLOOD PRESSURE: 106 MMHG | DIASTOLIC BLOOD PRESSURE: 72 MMHG

## 2017-07-29 LAB
ALBUMIN/GLOB SERPL: 1 {RATIO} (ref 0.9–2)
ALP SERPL-CCNC: 92 U/L (ref 45–117)
ALT SERPL-CCNC: 32 U/L (ref 12–78)
ANION GAP SERPL CALC-SCNC: 5 MMOL/L (ref 3–11)
AST SERPL-CCNC: 15 U/L (ref 15–37)
BASOPHILS # BLD: 0.01 K/UL (ref 0–0.2)
BASOPHILS NFR BLD: 0.2 %
BUN SERPL-MCNC: 24 MG/DL (ref 7–18)
BUN/CREAT SERPL: 14.8 (ref 10–20)
CALCIUM SERPL-MCNC: 8.8 MG/DL (ref 8.5–10.1)
CHLORIDE SERPL-SCNC: 102 MMOL/L (ref 98–107)
CO2 SERPL-SCNC: 29 MMOL/L (ref 21–32)
COMPLETE: YES
CREAT CL PREDICTED SERPL C-G-VRATE: 76.2 ML/MIN
CREAT SERPL-MCNC: 1.6 MG/DL (ref 0.6–1.4)
EOSINOPHIL NFR BLD AUTO: 142 K/UL (ref 130–400)
EST. AVERAGE GLUCOSE BLD GHB EST-MCNC: 169 MG/DL
GLOBULIN SER-MCNC: 3.6 GM/DL (ref 2.5–4)
GLUCOSE SERPL-MCNC: 159 MG/DL (ref 70–99)
HCT VFR BLD CALC: 42.3 % (ref 42–52)
IG%: 0.2 %
IMM GRANULOCYTES NFR BLD AUTO: 31.9 %
LYMPHOCYTES # BLD: 1.79 K/UL (ref 1.2–3.4)
MAGNESIUM SERPL-MCNC: 1.8 MG/DL (ref 1.8–2.4)
MCH RBC QN AUTO: 30 PG (ref 25–34)
MCHC RBC AUTO-ENTMCNC: 34 G/DL (ref 32–36)
MCV RBC AUTO: 88.1 FL (ref 80–100)
MONOCYTES NFR BLD: 10 %
NEUTROPHILS # BLD AUTO: 1.6 %
NEUTROPHILS NFR BLD AUTO: 56.1 %
PHOSPHATE SERPL-MCNC: 3.6 MG/DL (ref 2.5–4.9)
PMV BLD AUTO: 11 FL (ref 7.4–10.4)
POTASSIUM SERPL-SCNC: 3.7 MMOL/L (ref 3.5–5.1)
RBC # BLD AUTO: 4.8 M/UL (ref 4.7–6.1)
SODIUM SERPL-SCNC: 136 MMOL/L (ref 136–145)
WBC # BLD AUTO: 5.62 K/UL (ref 4.8–10.8)

## 2017-07-29 RX ADMIN — ACETYLCYSTEINE SCH MG: 200 SOLUTION ORAL; RESPIRATORY (INHALATION) at 08:01

## 2017-07-29 RX ADMIN — INSULIN ASPART SCH UNITS: 100 INJECTION, SOLUTION INTRAVENOUS; SUBCUTANEOUS at 08:31

## 2017-07-29 RX ADMIN — HEPARIN SODIUM SCH UNIT: 10000 INJECTION, SOLUTION INTRAVENOUS; SUBCUTANEOUS at 05:51

## 2017-07-29 RX ADMIN — ALLOPURINOL SCH MG: 100 TABLET ORAL at 07:59

## 2017-07-29 RX ADMIN — CARVEDILOL SCH MG: 25 TABLET, FILM COATED ORAL at 08:00

## 2017-07-29 RX ADMIN — INSULIN ASPART SCH UNITS: 100 INJECTION, SOLUTION INTRAVENOUS; SUBCUTANEOUS at 12:44

## 2017-07-29 RX ADMIN — FUROSEMIDE SCH MLS/MIN: 10 INJECTION, SOLUTION INTRAMUSCULAR; INTRAVENOUS at 08:02

## 2017-07-29 NOTE — DISCHARGE INSTRUCTIONS
Discharge Instructions


Date of Service


Jul 29, 2017.





Admission


Reason for Admission:  CHF





Discharge


Discharge Diagnosis / Problem:  Acute CHF exacerbation





Discharge Goals


Goal(s):  Decrease discomfort, Improve function, Increase independence, Improve 

disease control, Learn about illness, Diagnostic testing, Therapeutic 

intervention, Prevent Disease Progression





Activity Recommendations


Activity Limitations:  resume your previous activity





.





Instructions / Follow-Up


Instructions / Follow-Up


Patient to be discharged home


Please note addition of lasix 20 mg to take once daily, prescription sent to 

pharmacy


Please check weight on daily basis, if gain of 1-2 lbs in one day you can take 

an extra dose


Please follow up with cardiology in 1-2 weeks





Call your Primary Care doctor if any of the following symptoms or problems 

start or get worse:





* Shortness of breath or difficulty breathing


* Wake up at night short of breath


* Chest pain


* Cough


* Swelling of your hands, feet, or legs


* More fatigued or tired with your normal activity


* Palpitations - sudden fast heart beats





WEIGHT





* Weigh yourself every morning after using the bathroom.


* Use the same scale.


* Wear the same amount of clothing.


* Write your weight down on a chart.


* Call your Primary Care doctor if you gain more than 2-3 pounds in 1-2 days.





MEDICATIONS





* Use this discharge instruction sheet for medication instructions.


* Take your medications at the time your doctor ordered.


* Do not skip a dose of your medicines.


* Read your medicine information when you get home.


* Know all of the side effects of your medicine.  If in doubt, ask your 

pharmacist


* Call your Primary Care doctor's office if you have any side effects.


* Be sure all of your doctors know what medicine and herbs you take (including 

cold, flu, and herbal medicine).








Take the following with you to your follow-up doctor appointments:





* Weight Chart


* Medication List


* List of questions





Do not drink excessive alcohol, beer or wine.





Current Hospital Diet


Patient's current hospital diet: Low Sodium Diet (2gm Na)





Discharge Diet


Recommended Diet:  Low Sodium Diet (2gm Na)





Pending Studies


Studies pending at discharge:  no





Laboratory Results





Hemoglobin A1c








Test


  7/29/17


00:00 Range/Units


 


 


Estimated Average Glucose 169   mg/dl


 


Hemoglobin A1c 7.5 H 4.5-5.6  %








Lipid Panel








Test


  7/28/17


07:52 Range/Units


 


 


Triglycerides Level 89  0-150  mg/dl


 


Cholesterol Level 130  0-200  mg/dl


 


HDL Cholesterol 52   mg/dl


 


Cholesterol/HDL Ratio 2.5   


 


LDL Cholesterol, Calculated 60   mg/dl











Medical Emergencies








.


Who to Call and When:





Call 911 or go to the Emergency Room if:





* If at any time you feel your situation is an emergency


* You have tightness or pain in your chest that does not go away with rest or 

Nitroglycerin


* You are very short of breath even with rest





.





Non-Emergent Contact


Non-Emergency issues call your:  Primary Care Provider


Call Non-Emergent contact if:  you have any medication questions





.


.








"Provider Documentation" section prepared by Sal Rudd.








.





VTE Core Measure


Inpt VTE Proph given/why not?:  Unfractionated heparin SQ

## 2017-07-29 NOTE — DISCHARGE SUMMARY
Discharge Summary


Date of Service


Jul 29, 2017.





Discharge Summary


Admission Date:


Jul 28, 2017 at 10:18


Discharge Date:  Jul 29, 2017


Discharge Disposition:  Home


Principal Diagnosis:  Acute mixed diastolic and systolic CHF exacerbation


Immunizations:  


   Have You Had Influenza Vaccine:  No


   Influenza Vaccine Date:  Oct 25, 2010


   History of Tetanus Vaccine?:  Yes


   Tetanus Immunization Date:  Jul 9, 2000


   History of Pneumococcal:  Yes


   Pneumococcal Date:  Oct 6, 2010


   History of Hepatitis B Vaccine:  No


   Hepatitis Immunization Date:  Jul 9, 2006


Consultations:


Cardiology





Medication Reconciliation


New Medications:  


Furosemide (Lasix) 20 Mg Tab


20 MG PO DAILY, #30 TAB





 


Continued Medications:  


Allopurinol (Zyloprim) 100 Mg Tab


0.5 TAB PO BID, TAB





Aspirin (Aspirin Ec) 81 Mg Tab


81 MG PO DAILY





Atorvastatin (Lipitor) 20 Mg Tab


20 MG PO DAILY, TAB





Carvedilol (Coreg) 25 Mg Tab


25 MG PO BID, TAB





Insulin Glargine-Lixisenatide (Soliqua 100/33 100-33 Unt-Mcg/ml) 1 Inj Inj


38 UNITS SC HS





Insulin Lispro (Human) (Humalog) 100 Unit/Ml Inj


SC WM


SLIDING SCALE


Lisinopril (Zestril) 10 Mg Tab


20 MG PO QAM





Lisinopril (Zestril) 10 Mg Tab


10 MG PO QPM





Magnesium (Magnesium) 200 Mg Tab


100 MG PO QAM





Omeprazole Magnesium (Prilosec Otc) 20 Mg Tab


20 MG PO, TAB


PRN








Discharge Exam


Review of Systems:  


   Constitutional:  No fever, No chills, No sweats, No weakness


   ENT:  No hearing loss, No unusual epistaxis, No nasal symptoms, No sore 

throat


   Respiratory:  No cough, No sputum, No wheezing, No shortness of breath, No 

dyspnea on exertion


   Cardiovascular:  No chest pain, No orthopnea, No PND, No edema


   Abdomen:  No pain, No nausea, No vomiting, No diarrhea


   Musculoskeletal:  No joint pain, No muscle pain, No swelling, No calf pain


   Genitourinary - Male:  No hematuria, No dysuria, No urinary frequency, No 

urinary urgency


   Neurologic:  No memory loss, No paralysis, No weakness, No vertigo


   Psychiatric:  No depression symptoms, No anhedonism, No anxiety, No insomnia


   Endocrine:  No fatigue, No excessive thirst


   Integumentary:  No rash, No itch


Physical Exam:  


   General Appearance:  WD/WN, no apparent distress


   Eyes:  normal inspection, PERRL, EOMI, sclerae normal


   Neck:  supple, no adenopathy, thyroid normal, no JVD


   Respiratory/Chest:  chest non-tender, lungs clear, normal breath sounds, no 

respiratory distress


   Cardiovascular:  regular rate, rhythm, no edema, no gallop, no JVD


   Abdomen / GI:  normal bowel sounds, non tender, soft, no organomegaly


   Extremities:  normal inspection, no calf tenderness, normal capillary refill

, no pedal edema


   Neurologic/Psychiatric:  no motor/sensory deficits, alert, normal mood/affect

, normal reflexes, oriented x 3





Hospital Course


ASSESSMENT:


1.  Acute hypoxic respiratory failure secondary to below.


2.  Acute systolic/diastolic congestive heart failure, acute on chronic.


3.  Diabetes mellitus, insulin requiring.


4.  Dyslipidemia.


5.  Morbid obesity.


6.  Hypertension.


7.  Clinically suspected obstructive sleep apnea.


8.  Chronic kidney disease stage III.


 


PLAN:


Admit to telemetry.


Exact etiology unclear


No cardiac events, EKG no ischemic events


No arrhythmias noted


Responded well to lasix 20 mg IV BID with adequate diuresis


ECHO unchanged from previous, EF 20-25%, severe diastolic dysfunction


Cardiology consulted


Will add lasix 20 mg PO daily on discharge


F/U with cardiology on discharge


Total Time Spent:  Greater than 30 minutes


This includes examination of the patient, discharge planning, medication 

reconciliation, and communication with other providers.





Discharge Instructions


Please refer to the electronic Patient Visit Report (Discharge Instructions) 

for additional information.





Additional Copies To


ANDREA Coleman MD

## 2017-07-29 NOTE — ECHOCARDIOGRAM REPORT
*NOTICE TO RECEIVING PARTY AGENCY**  This information is strictly Confidential and protected under 
[place] law.  [place] law prohibits you from making any further disclosure of this 
information unless further disclosure is expressly permitted by the written consent of the person to 
whom it pertains or is authorized by law.  A general authorization for the release of medical or 
other information is not sufficient for this purpose.  Hospital accepts no responsibility if the 
information is made available to any other person, INCLUDING THE PATIENT.



Interpretation Summary

  *  Name: DARRYL SAHNI  Study Date: 2017 06:58 AM  BP: 103/62 mmHg

  *  MRN: [id number]  Patient Location: [facility]\S\N276\S\1  HR: 94

  *  : 1970 (M/d/yyyy)  Gender: Male  Height: 70 in

  *  Age: 47 yrs  Ethnicity: [place]  Weight: 267 lb

  *  Ordering Physician: Elvie Miller

  *  Referring Physician: Self, Referred

  *  Performed By: Kandis Blank RCS

  *  Accession# [id number]  Account# [id number]

  *  Reason For Study: Dilated cardiomyopathy

  *  BSA: 2.4 m2

  *  -- Conclusions --

  *  The left ventricle is severely dilated.

  *  Left ventricular systolic function is severely reduced.

  *  The left atrium is severely dilated.

  *  The right atrium is mildly dilated.

  *  There is moderate mitral regurgitation.

  *  There is evidence of restrictive filling and severe diastolic dysfunction.

  *  Compared to an echocardiogram from 2011, there does not appear to be any significant change

Procedure Details

  *  A complete two-dimensional transthoracic echocardiogram was performed (2D, M-mode, Doppler and 
color flow Doppler).

  *  The study was technically adequate.

Left Ventricle

  *  The left ventricle is severely dilated.

  *  There is normal left ventricular wall thickness.

  *  Left ventricular systolic function is severely reduced.

  *  Ejection Fraction = 20-25%.

  *  Severe global hypokinesis with some apacal akinesis

Right Ventricle

  *  The right ventricle is grossly normal size.

  *  There is a pacemaker lead in the right ventricle.

  *  The right ventricular systolic function is normal as assessed by tricuspid annular plane 
systolic excursion (TAPSE) (normal >1.5 cm).

Atria

  *  The left atrium is severely dilated.

  *  The right atrium is mildly dilated.

  *  There is a catheter/pacemaker lead seen in the right atrium.

Mitral Valve

  *  The mitral valve is grossly normal.

  *  There is moderate mitral regurgitation.

Tricuspid Valve

  *  The tricuspid valve is not well visualized, but is grossly normal.

  *  Significant tricuspid regurgitation is absent.

Aortic Valve

  *  The aortic valve is normal in structure and function.

  *  No hemodynamically significant valvular aortic stenosis.

  *  There is no significant aortic regurgitation.

Great Vessels

  *  The aortic root is normal size.

Pericardium/Pleural

  *  There is no pericardial effusion.

Left Ventricular Diastolic Function

  *  There is evidence of restrictive filling and severe diastolic dysfunction.



MMode 2D Measurements and Calculations

IVSd 10 cm

IVSs 1.2 cm



LVIDd 8.1 cm

LVIDs 6.3 cm

LVPWd 0.99 cm

LVPWs 1.6 cm



IVS/LVPW 1.0 

FS 23.1 %

EDV(Teich) 358.0 ml

ESV(Teich) 198.0 ml

EF(Teich) 44.7 %



EDV(cubed) 539.0 ml

ESV(cubed) 244.8 ml

EF(cubed) 54.6 %

% IVS thick 19.3 %

% LVPW thick 63.0 %





LV mass(C)d 415.1 grams

LV mass(C)dI 175.9 grams/m\S\2

LV mass(C)s 414.4 grams

LV mass(C)sI 175.6 grams/m\S\2



SV(Teich) 160.0 ml

SI(Teich) 67.8 ml/m\S\2

SV(cubed) 294.2 ml

SI(cubed) 124.6 ml/m\S\2



Ao root diam 2.7 cm

Ao root area 5.8 cm\S\2

ACS 1.7 cm

LA dimension 6.0 cm



asc Aorta Diam 2.9 cm





LA/Ao 2.2 

LVOT diam 1.9 cm

LVOT area 2.9 cm\S\2



LVAd ap4 59.3 cm\S\2

LVLd ap4 10.2 cm

EDV(MOD-sp4) 276.6 ml

EDV(sp4-el) 292.0 ml

LVAs ap4 49.1 cm\S\2

LVLs ap4 9.4 cm

ESV(MOD-sp4) 205.9 ml

ESV(sp4-el) 218.5 ml

EF(MOD-sp4) 25.5 %

EF(sp4-el) 25.2 %



LVAd ap2 52.4 cm\S\2

LVLd ap2 9.6 cm

EDV(MOD-sp2) 233.3 ml

EDV(sp2-el) 243.3 ml

LVAs ap2 44.5 cm\S\2

LVLs ap2 9.2 cm

ESV(MOD-sp2) 182.2 ml

ESV(sp2-el) 182.8 ml

EF(MOD-sp2) 21.9 %

EF(sp2-el) 24.9 %



LVLd %diff -6.51 %

EDV(MOD-bp) 264.9 ml

LVLs %diff -1.73 %

ESV(MOD-bp) 199.7 ml

EF(MOD-bp) 24.6 %





SV(MOD-sp4) 70.7 ml

SI(MOD-sp4) 29.9 ml/m\S\2



SV(MOD-sp2) 51.1 ml

SI(MOD-sp2) 21.7 ml/m\S\2



SV(MOD-bp) 65.2 ml

SI(MOD-bp) 27.6 ml/m\S\2



SV(sp4-el) 73.5 ml

SI(sp4-el) 31.1 ml/m\S\2





SV(sp2-el) 60.5 ml

SI(sp2-el) 25.6 ml/m\S\2













Doppler Measurements and Calculations

MV E max dain 94.4 cm/sec



MV dec time 0.27 sec



Ao V2 max 115.6 cm/sec

Ao max PG 5.3 mmHg

Ao max PG (full) 2.4 mmHg

SPEEDY(V,A) 2.1 cm\S\2

SPEEDY(V,D) 2.1 cm\S\2



LV V1 max PG 2.9 mmHg





LV V1 max 85.3 cm/sec

## 2017-08-25 ENCOUNTER — HOSPITAL ENCOUNTER (OUTPATIENT)
Dept: HOSPITAL 45 - C.LAB1850 | Age: 47
Discharge: HOME | End: 2017-08-25
Attending: INTERNAL MEDICINE
Payer: COMMERCIAL

## 2017-08-25 DIAGNOSIS — I42.9: ICD-10-CM

## 2017-08-25 DIAGNOSIS — E10.65: ICD-10-CM

## 2017-08-25 DIAGNOSIS — R80.9: Primary | ICD-10-CM

## 2017-08-25 LAB
ANION GAP SERPL CALC-SCNC: 7 MMOL/L (ref 3–11)
APPEARANCE UR: CLEAR
BILIRUB UR-MCNC: (no result) MG/DL
BUN SERPL-MCNC: 20 MG/DL (ref 7–18)
BUN/CREAT SERPL: 15.7 (ref 10–20)
CALCIUM SERPL-MCNC: 9.1 MG/DL (ref 8.5–10.1)
CHLORIDE SERPL-SCNC: 107 MMOL/L (ref 98–107)
CO2 SERPL-SCNC: 27 MMOL/L (ref 21–32)
COLOR UR: YELLOW
CREAT SERPL-MCNC: 1.3 MG/DL (ref 0.6–1.4)
CREAT UR-MCNC: 150 MG/DL
GLUCOSE SERPL-MCNC: 149 MG/DL (ref 70–99)
MANUAL MICROSCOPIC REQUIRED?: NO
NITRITE UR QL STRIP: (no result)
PH UR STRIP: 5 [PH] (ref 4.5–7.5)
POTASSIUM SERPL-SCNC: 3.9 MMOL/L (ref 3.5–5.1)
RATIO: 35.1 MCG/MG (ref 0–30)
REVIEW REQ?: NO
SODIUM SERPL-SCNC: 141 MMOL/L (ref 136–145)
SP GR UR STRIP: 1.02 (ref 1–1.03)
TSH SERPL-ACNC: 2.21 UIU/ML (ref 0.3–4.5)
URINE EPITHELIAL CELL AUTO: (no result) /LPF (ref 0–5)
UROBILINOGEN UR-MCNC: (no result) MG/DL
ZZUR CULT IF INDIC CLEAN CATCH: NO

## 2018-05-07 ENCOUNTER — HOSPITAL ENCOUNTER (OUTPATIENT)
Dept: HOSPITAL 45 - C.LAB1850 | Age: 48
Discharge: HOME | End: 2018-05-07
Attending: PHYSICIAN ASSISTANT
Payer: COMMERCIAL

## 2018-05-07 DIAGNOSIS — I42.9: Primary | ICD-10-CM

## 2018-05-07 LAB
BASOPHILS # BLD: 0.03 K/UL (ref 0–0.2)
BASOPHILS NFR BLD: 0.5 %
BUN SERPL-MCNC: 25 MG/DL (ref 7–18)
CALCIUM SERPL-MCNC: 8.6 MG/DL (ref 8.5–10.1)
CO2 SERPL-SCNC: 26 MMOL/L (ref 21–32)
CREAT SERPL-MCNC: 1.61 MG/DL (ref 0.6–1.4)
EOS ABS #: 0.07 K/UL (ref 0–0.5)
EOSINOPHIL NFR BLD AUTO: 187 K/UL (ref 130–400)
GLUCOSE SERPL-MCNC: 213 MG/DL (ref 70–99)
HCT VFR BLD CALC: 41.1 % (ref 42–52)
HGB BLD-MCNC: 13.9 G/DL (ref 14–18)
IG#: 0 K/UL (ref 0–0.02)
IMM GRANULOCYTES NFR BLD AUTO: 22.2 %
INR PPP: 0.9 (ref 0.9–1.1)
LYMPHOCYTES # BLD: 1.21 K/UL (ref 1.2–3.4)
MCH RBC QN AUTO: 30 PG (ref 25–34)
MCHC RBC AUTO-ENTMCNC: 33.8 G/DL (ref 32–36)
MCV RBC AUTO: 88.6 FL (ref 80–100)
MONO ABS #: 0.43 K/UL (ref 0.11–0.59)
MONOCYTES NFR BLD: 7.9 %
NEUT ABS #: 3.72 K/UL (ref 1.4–6.5)
NEUTROPHILS # BLD AUTO: 1.3 %
NEUTROPHILS NFR BLD AUTO: 68.1 %
PMV BLD AUTO: 10.2 FL (ref 7.4–10.4)
POTASSIUM SERPL-SCNC: 4.4 MMOL/L (ref 3.5–5.1)
PTT PATIENT: 26.4 SECONDS (ref 21–31)
RED CELL DISTRIBUTION WIDTH CV: 13 % (ref 11.5–14.5)
RED CELL DISTRIBUTION WIDTH SD: 41.7 FL (ref 36.4–46.3)
SODIUM SERPL-SCNC: 139 MMOL/L (ref 136–145)
WBC # BLD AUTO: 5.46 K/UL (ref 4.8–10.8)